# Patient Record
Sex: MALE | Race: ASIAN | NOT HISPANIC OR LATINO
[De-identification: names, ages, dates, MRNs, and addresses within clinical notes are randomized per-mention and may not be internally consistent; named-entity substitution may affect disease eponyms.]

---

## 2022-03-18 PROBLEM — Z00.00 ENCOUNTER FOR PREVENTIVE HEALTH EXAMINATION: Status: ACTIVE | Noted: 2022-03-18

## 2022-03-24 ENCOUNTER — APPOINTMENT (OUTPATIENT)
Dept: THORACIC SURGERY | Facility: CLINIC | Age: 57
End: 2022-03-24
Payer: MEDICAID

## 2022-03-24 VITALS
SYSTOLIC BLOOD PRESSURE: 108 MMHG | HEART RATE: 64 BPM | WEIGHT: 163 LBS | HEIGHT: 68.11 IN | RESPIRATION RATE: 18 BRPM | DIASTOLIC BLOOD PRESSURE: 73 MMHG | OXYGEN SATURATION: 98 % | TEMPERATURE: 98.1 F | BODY MASS INDEX: 24.71 KG/M2

## 2022-03-24 DIAGNOSIS — Z86.69 PERSONAL HISTORY OF OTHER DISEASES OF THE NERVOUS SYSTEM AND SENSE ORGANS: ICD-10-CM

## 2022-03-24 DIAGNOSIS — Z87.891 PERSONAL HISTORY OF NICOTINE DEPENDENCE: ICD-10-CM

## 2022-03-24 DIAGNOSIS — Z82.3 FAMILY HISTORY OF STROKE: ICD-10-CM

## 2022-03-24 DIAGNOSIS — Z86.39 PERSONAL HISTORY OF OTHER ENDOCRINE, NUTRITIONAL AND METABOLIC DISEASE: ICD-10-CM

## 2022-03-24 PROCEDURE — 99205 OFFICE O/P NEW HI 60 MIN: CPT

## 2022-03-25 PROBLEM — Z86.39 HISTORY OF HYPERLIPIDEMIA: Status: RESOLVED | Noted: 2022-03-25 | Resolved: 2022-03-25

## 2022-03-25 PROBLEM — Z87.891 FORMER SMOKER: Status: ACTIVE | Noted: 2022-03-25

## 2022-03-25 PROBLEM — Z82.3 FAMILY HISTORY OF CEREBROVASCULAR ACCIDENT (CVA): Status: ACTIVE | Noted: 2022-03-25

## 2022-03-25 PROBLEM — Z86.69 HISTORY OF MIGRAINE HEADACHES: Status: RESOLVED | Noted: 2022-03-25 | Resolved: 2022-03-25

## 2022-03-25 RX ORDER — OMEPRAZOLE 40 MG/1
40 CAPSULE, DELAYED RELEASE ORAL
Refills: 0 | Status: ACTIVE | COMMUNITY

## 2022-03-25 RX ORDER — ATORVASTATIN CALCIUM 20 MG/1
20 TABLET, FILM COATED ORAL
Refills: 0 | Status: ACTIVE | COMMUNITY

## 2022-03-25 RX ORDER — ASPIRIN 81 MG
81 TABLET, DELAYED RELEASE (ENTERIC COATED) ORAL
Refills: 0 | Status: ACTIVE | COMMUNITY

## 2022-03-28 NOTE — ASSESSMENT
[FreeTextEntry1] : Mr. AMBROSIO LAGUNA, 56 year old male, former smoker, w/ hx of HLD, migraine/headache, who presented to Pulm with worsening snoring and cloudy head in the morning, suspicious for FILI. His recent CT Chest showed abnormal anterior mediastinal mass which is suspicious for malignancy due to smoking history. \par \par PFTs on 2/22/22: %, FEV1 102%, DLCO 63%\par \par CT Chest on 3/15/22:\par - a 1.4 x 1.8 cm soft tissue nodule in the anterior mediastinum which has lobular margins\par \par I have reviewed the patient's medical records and diagnostic images at time of this office consultation and have made the following recommendation:\par 1. CT reviewed with pt, suspicious for thymoma. I recommended Rt VATS, robotic-assisted, excision of anterior mediastinal mass on 4/19/22. Risks and benefits and alternatives explained to patient, all questions answered, patient agreed to proceed with surgery.\par 2. Medical clearance\par 3. Blood work for M.G. panel\par \par \par I personally performed the services described in the documentation, reviewed the documentation recorded by the scribe in my presence and it accurately and completely records my words and actions. \par \par I, Samantha Foreman NP, am scribing for and the presence of Dr. Marc Mancilla the following sections, HISTORY OF PRESENT ILLNESS, PAST MEDICAL/FAMILY/SOCIAL HISTORY; REVIEW OF SYSTEMS; VITAL SIGNS; PHYSICAL EXAM; DISPOSITION.\par

## 2022-03-28 NOTE — DATA REVIEWED
[FreeTextEntry1] : I have independently reviewed patient's \par PFTs on 2/22/22\par CT Chest on 3/15/22\par \par

## 2022-03-28 NOTE — CONSULT LETTER
[Dear  ___] : Dear  [unfilled], [Consult Letter:] : I had the pleasure of evaluating your patient, [unfilled]. [( Thank you for referring [unfilled] for consultation for _____ )] : Thank you for referring [unfilled] for consultation for [unfilled] [Please see my note below.] : Please see my note below. [Consult Closing:] : Thank you very much for allowing me to participate in the care of this patient.  If you have any questions, please do not hesitate to contact me. [Sincerely,] : Sincerely, [FreeTextEntry2] : Eugenio Clay MD (Pulm/ref) [FreeTextEntry3] : Marc Mancilla MD, MPH \par System Director of Thoracic Surgery \par Director of Comprehensive Lung and Foregut [place] \par Professor Cardiovascular & Thoracic Surgery  \par [facility] [employer] [facility]\par

## 2022-03-28 NOTE — HISTORY OF PRESENT ILLNESS
[FreeTextEntry1] : Mr. AMBROSIO LAGUNA, 56 year old male, former smoker, w/ hx of HLD, migraine/headache, who presented to Pulm with worsening snoring and cloudy head in the morning, suspicious for FILI. His recent CT Chest showed abnormal anterior mediastinal mass which is suspicious for malignancy due to smoking history. \par \par PFTs on 2/22/22: %, FEV1 102%, DLCO 63%\par \par CT Chest on 3/15/22:\par - a 1.4 x 1.8 cm soft tissue nodule in the anterior mediastinum which has lobular margins\par \par Pt presents today for CT Sx consultation, referred by Dr. Clay. Pt admits to generalized body aches, SOB on exertion, poor appetite, and weight loss. \par  ONOFRE/SHORTNESS OF BREATH

## 2022-03-28 NOTE — PHYSICAL EXAM
[Restricted in physically strenuous activity but ambulatory and able to carry out work of a light or sedentary nature] : Status 1- Restricted in physically strenuous activity but ambulatory and able to carry out work of a light or sedentary nature, e.g., light house work, office work [General Appearance - Alert] : alert [General Appearance - In No Acute Distress] : in no acute distress [Sclera] : the sclera and conjunctiva were normal [PERRL With Normal Accommodation] : pupils were equal in size, round, and reactive to light [Extraocular Movements] : extraocular movements were intact [Outer Ear] : the ears and nose were normal in appearance [Oropharynx] : the oropharynx was normal [Neck Appearance] : the appearance of the neck was normal [Neck Cervical Mass (___cm)] : no neck mass was observed [Jugular Venous Distention Increased] : there was no jugular-venous distention [Thyroid Diffuse Enlargement] : the thyroid was not enlarged [Thyroid Nodule] : there were no palpable thyroid nodules [Auscultation Breath Sounds / Voice Sounds] : lungs were clear to auscultation bilaterally [Heart Rate And Rhythm] : heart rate was normal and rhythm regular [Heart Sounds] : normal S1 and S2 [Heart Sounds Gallop] : no gallops [Murmurs] : no murmurs [Heart Sounds Pericardial Friction Rub] : no pericardial rub [Examination Of The Chest] : the chest was normal in appearance [Diminished Respiratory Excursion] : normal chest expansion [Chest Visual Inspection Thoracic Asymmetry] : no chest asymmetry [2+] : right 2+ [Breast Appearance] : normal in appearance [Breast Palpation Mass] : no palpable masses [Abdomen Soft] : soft [Bowel Sounds] : normal bowel sounds [Abdomen Tenderness] : non-tender [Abdomen Mass (___ Cm)] : no abdominal mass palpated [Cervical Lymph Nodes Enlarged Posterior Bilaterally] : posterior cervical [Cervical Lymph Nodes Enlarged Anterior Bilaterally] : anterior cervical [Supraclavicular Lymph Nodes Enlarged Bilaterally] : supraclavicular [No CVA Tenderness] : no ~M costovertebral angle tenderness [No Spinal Tenderness] : no spinal tenderness [Abnormal Walk] : normal gait [Nail Clubbing] : no clubbing  or cyanosis of the fingernails [Musculoskeletal - Swelling] : no joint swelling seen [Motor Tone] : muscle strength and tone were normal [Skin Color & Pigmentation] : normal skin color and pigmentation [Skin Turgor] : normal skin turgor [] : no rash [Deep Tendon Reflexes (DTR)] : deep tendon reflexes were 2+ and symmetric [Sensation] : the sensory exam was normal to light touch and pinprick [No Focal Deficits] : no focal deficits [Oriented To Time, Place, And Person] : oriented to person, place, and time [Impaired Insight] : insight and judgment were intact [Affect] : the affect was normal [FreeTextEntry1] : deferred

## 2022-04-07 ENCOUNTER — OUTPATIENT (OUTPATIENT)
Dept: OUTPATIENT SERVICES | Facility: HOSPITAL | Age: 57
LOS: 1 days | End: 2022-04-07
Payer: MEDICAID

## 2022-04-07 VITALS
TEMPERATURE: 98 F | WEIGHT: 162.04 LBS | SYSTOLIC BLOOD PRESSURE: 115 MMHG | HEIGHT: 68 IN | RESPIRATION RATE: 16 BRPM | OXYGEN SATURATION: 99 % | DIASTOLIC BLOOD PRESSURE: 80 MMHG | HEART RATE: 65 BPM

## 2022-04-07 DIAGNOSIS — J98.59 OTHER DISEASES OF MEDIASTINUM, NOT ELSEWHERE CLASSIFIED: ICD-10-CM

## 2022-04-07 LAB
ALBUMIN SERPL ELPH-MCNC: 4.6 G/DL — SIGNIFICANT CHANGE UP (ref 3.3–5)
ALP SERPL-CCNC: 81 U/L — SIGNIFICANT CHANGE UP (ref 40–120)
ALT FLD-CCNC: 40 U/L — SIGNIFICANT CHANGE UP (ref 4–41)
ANION GAP SERPL CALC-SCNC: 13 MMOL/L — SIGNIFICANT CHANGE UP (ref 7–14)
AST SERPL-CCNC: 26 U/L — SIGNIFICANT CHANGE UP (ref 4–40)
BILIRUB SERPL-MCNC: 0.2 MG/DL — SIGNIFICANT CHANGE UP (ref 0.2–1.2)
BLD GP AB SCN SERPL QL: NEGATIVE — SIGNIFICANT CHANGE UP
BUN SERPL-MCNC: 19 MG/DL — SIGNIFICANT CHANGE UP (ref 7–23)
CALCIUM SERPL-MCNC: 8.8 MG/DL — SIGNIFICANT CHANGE UP (ref 8.4–10.5)
CHLORIDE SERPL-SCNC: 104 MMOL/L — SIGNIFICANT CHANGE UP (ref 98–107)
CO2 SERPL-SCNC: 22 MMOL/L — SIGNIFICANT CHANGE UP (ref 22–31)
CREAT SERPL-MCNC: 0.95 MG/DL — SIGNIFICANT CHANGE UP (ref 0.5–1.3)
EGFR: 93 ML/MIN/1.73M2 — SIGNIFICANT CHANGE UP
GLUCOSE SERPL-MCNC: 113 MG/DL — HIGH (ref 70–99)
HCT VFR BLD CALC: 39.1 % — SIGNIFICANT CHANGE UP (ref 39–50)
HGB BLD-MCNC: 13.6 G/DL — SIGNIFICANT CHANGE UP (ref 13–17)
MCHC RBC-ENTMCNC: 32.1 PG — SIGNIFICANT CHANGE UP (ref 27–34)
MCHC RBC-ENTMCNC: 34.8 GM/DL — SIGNIFICANT CHANGE UP (ref 32–36)
MCV RBC AUTO: 92.2 FL — SIGNIFICANT CHANGE UP (ref 80–100)
NRBC # BLD: 0 /100 WBCS — SIGNIFICANT CHANGE UP
NRBC # FLD: 0 K/UL — SIGNIFICANT CHANGE UP
PLATELET # BLD AUTO: 192 K/UL — SIGNIFICANT CHANGE UP (ref 150–400)
POTASSIUM SERPL-MCNC: 4.1 MMOL/L — SIGNIFICANT CHANGE UP (ref 3.5–5.3)
POTASSIUM SERPL-SCNC: 4.1 MMOL/L — SIGNIFICANT CHANGE UP (ref 3.5–5.3)
PROT SERPL-MCNC: 6.9 G/DL — SIGNIFICANT CHANGE UP (ref 6–8.3)
RBC # BLD: 4.24 M/UL — SIGNIFICANT CHANGE UP (ref 4.2–5.8)
RBC # FLD: 12.2 % — SIGNIFICANT CHANGE UP (ref 10.3–14.5)
RH IG SCN BLD-IMP: POSITIVE — SIGNIFICANT CHANGE UP
SODIUM SERPL-SCNC: 139 MMOL/L — SIGNIFICANT CHANGE UP (ref 135–145)
WBC # BLD: 5.24 K/UL — SIGNIFICANT CHANGE UP (ref 3.8–10.5)
WBC # FLD AUTO: 5.24 K/UL — SIGNIFICANT CHANGE UP (ref 3.8–10.5)

## 2022-04-07 PROCEDURE — 93010 ELECTROCARDIOGRAM REPORT: CPT

## 2022-04-07 RX ORDER — SODIUM CHLORIDE 9 MG/ML
1000 INJECTION, SOLUTION INTRAVENOUS
Refills: 0 | Status: CANCELLED | OUTPATIENT
Start: 2022-04-19 | End: 2022-04-14

## 2022-04-07 NOTE — H&P PST ADULT - NSICDXPASTMEDICALHX_GEN_ALL_CORE_FT
PAST MEDICAL HISTORY:  Former smoker     GERD (gastroesophageal reflux disease)     HLD (hyperlipidemia)     Migraine

## 2022-04-07 NOTE — H&P PST ADULT - ALCOHOL USE HISTORY SINGLE SELECT
Head,  normocephalic,  atraumatic,  Face,  Face within normal limits,  Ears,  External ears within normal limits,  Nose/Nasopharynx,  External nose  normal appearance
never

## 2022-04-07 NOTE — H&P PST ADULT - HISTORY OF PRESENT ILLNESS
56 year old male with PMH of HLD, migraine, former smoker  presents to Presurgical testing with diagnosis of  other disease of mediastinum , not elsewhere classified  scheduled for robotic right video assisted thoracoscopy, excision of anterior mediastinum mass.

## 2022-04-07 NOTE — H&P PST ADULT - NSANTHOSAYNRD_GEN_A_CORE
No. FILI screening performed.  STOP BANG Legend: 0-2 = LOW Risk; 3-4 = INTERMEDIATE Risk; 5-8 = HIGH Risk

## 2022-04-07 NOTE — H&P PST ADULT - PROBLEM SELECTOR PLAN 1
Patient tentatively scheduled for robotic right video assisted thoracoscopy, excision of anterior mediastinum mass on 4/19/22.  Pre-op instructions provided. Pt given verbal and written instructions with teach back on chlorhexidine wash and pepcid. Pt verbalized understanding with return demonstration.   Patient instructed to call surgeon's office to schedule a COVID-19 test  prior to procedure.   FILI precautions

## 2022-04-12 NOTE — ASU PATIENT PROFILE, ADULT - FALL HARM RISK - UNIVERSAL INTERVENTIONS
Bed in lowest position, wheels locked, appropriate side rails in place/Call bell, personal items and telephone in reach/Instruct patient to call for assistance before getting out of bed or chair/Non-slip footwear when patient is out of bed/[place] to call system/Physically safe environment - no spills, clutter or unnecessary equipment/Purposeful Proactive Rounding/Room/bathroom lighting operational, light cord in reach

## 2022-04-13 ENCOUNTER — APPOINTMENT (OUTPATIENT)
Dept: THORACIC SURGERY | Facility: HOSPITAL | Age: 57
End: 2022-04-13

## 2022-04-13 ENCOUNTER — INPATIENT (INPATIENT)
Facility: HOSPITAL | Age: 57
LOS: 0 days | Discharge: HOME | End: 2022-04-14
Attending: THORACIC SURGERY (CARDIOTHORACIC VASCULAR SURGERY) | Admitting: THORACIC SURGERY (CARDIOTHORACIC VASCULAR SURGERY)
Payer: MEDICAID

## 2022-04-13 ENCOUNTER — RESULT REVIEW (OUTPATIENT)
Age: 57
End: 2022-04-13

## 2022-04-13 VITALS
WEIGHT: 162.04 LBS | RESPIRATION RATE: 16 BRPM | HEART RATE: 56 BPM | SYSTOLIC BLOOD PRESSURE: 112 MMHG | TEMPERATURE: 98 F | OXYGEN SATURATION: 99 % | DIASTOLIC BLOOD PRESSURE: 68 MMHG | HEIGHT: 68 IN

## 2022-04-13 DIAGNOSIS — J98.59 OTHER DISEASES OF MEDIASTINUM, NOT ELSEWHERE CLASSIFIED: ICD-10-CM

## 2022-04-13 LAB — RH IG SCN BLD-IMP: POSITIVE — SIGNIFICANT CHANGE UP

## 2022-04-13 PROCEDURE — 71045 X-RAY EXAM CHEST 1 VIEW: CPT | Mod: 26

## 2022-04-13 PROCEDURE — 99233 SBSQ HOSP IP/OBS HIGH 50: CPT

## 2022-04-13 PROCEDURE — 32673 THORACOSCOPY W/THYMUS RESECT: CPT

## 2022-04-13 PROCEDURE — 32673 THORACOSCOPY W/THYMUS RESECT: CPT | Mod: AS

## 2022-04-13 PROCEDURE — 32662 THORACOSCOPY W/MEDIAST EXC: CPT | Mod: 59

## 2022-04-13 PROCEDURE — 88307 TISSUE EXAM BY PATHOLOGIST: CPT | Mod: 26

## 2022-04-13 PROCEDURE — 32662 THORACOSCOPY W/MEDIAST EXC: CPT | Mod: AS,59

## 2022-04-13 PROCEDURE — S2900 ROBOTIC SURGICAL SYSTEM: CPT | Mod: NC

## 2022-04-13 RX ORDER — OMEPRAZOLE 10 MG/1
1 CAPSULE, DELAYED RELEASE ORAL
Qty: 0 | Refills: 0 | DISCHARGE

## 2022-04-13 RX ORDER — ATORVASTATIN CALCIUM 80 MG/1
20 TABLET, FILM COATED ORAL AT BEDTIME
Refills: 0 | Status: DISCONTINUED | OUTPATIENT
Start: 2022-04-13 | End: 2022-04-14

## 2022-04-13 RX ORDER — NALOXONE HYDROCHLORIDE 4 MG/.1ML
0.1 SPRAY NASAL
Refills: 0 | Status: DISCONTINUED | OUTPATIENT
Start: 2022-04-13 | End: 2022-04-14

## 2022-04-13 RX ORDER — PANTOPRAZOLE SODIUM 20 MG/1
40 TABLET, DELAYED RELEASE ORAL
Refills: 0 | Status: DISCONTINUED | OUTPATIENT
Start: 2022-04-13 | End: 2022-04-14

## 2022-04-13 RX ORDER — HYDROMORPHONE HYDROCHLORIDE 2 MG/ML
30 INJECTION INTRAMUSCULAR; INTRAVENOUS; SUBCUTANEOUS
Refills: 0 | Status: DISCONTINUED | OUTPATIENT
Start: 2022-04-13 | End: 2022-04-14

## 2022-04-13 RX ORDER — GABAPENTIN 400 MG/1
300 CAPSULE ORAL ONCE
Refills: 0 | Status: COMPLETED | OUTPATIENT
Start: 2022-04-13 | End: 2022-04-13

## 2022-04-13 RX ORDER — HEPARIN SODIUM 5000 [USP'U]/ML
5000 INJECTION INTRAVENOUS; SUBCUTANEOUS EVERY 8 HOURS
Refills: 0 | Status: DISCONTINUED | OUTPATIENT
Start: 2022-04-13 | End: 2022-04-14

## 2022-04-13 RX ORDER — ATORVASTATIN CALCIUM 80 MG/1
1 TABLET, FILM COATED ORAL
Qty: 0 | Refills: 0 | DISCHARGE

## 2022-04-13 RX ORDER — ONDANSETRON 8 MG/1
4 TABLET, FILM COATED ORAL EVERY 6 HOURS
Refills: 0 | Status: DISCONTINUED | OUTPATIENT
Start: 2022-04-13 | End: 2022-04-14

## 2022-04-13 RX ORDER — ACETAMINOPHEN 500 MG
975 TABLET ORAL ONCE
Refills: 0 | Status: COMPLETED | OUTPATIENT
Start: 2022-04-13 | End: 2022-04-13

## 2022-04-13 RX ORDER — HYDROMORPHONE HYDROCHLORIDE 2 MG/ML
0.5 INJECTION INTRAMUSCULAR; INTRAVENOUS; SUBCUTANEOUS
Refills: 0 | Status: DISCONTINUED | OUTPATIENT
Start: 2022-04-13 | End: 2022-04-14

## 2022-04-13 RX ADMIN — GABAPENTIN 300 MILLIGRAM(S): 400 CAPSULE ORAL at 06:22

## 2022-04-13 RX ADMIN — Medication 975 MILLIGRAM(S): at 06:22

## 2022-04-13 RX ADMIN — HYDROMORPHONE HYDROCHLORIDE 30 MILLILITER(S): 2 INJECTION INTRAMUSCULAR; INTRAVENOUS; SUBCUTANEOUS at 15:18

## 2022-04-13 RX ADMIN — ATORVASTATIN CALCIUM 20 MILLIGRAM(S): 80 TABLET, FILM COATED ORAL at 21:43

## 2022-04-13 RX ADMIN — HEPARIN SODIUM 5000 UNIT(S): 5000 INJECTION INTRAVENOUS; SUBCUTANEOUS at 13:26

## 2022-04-13 RX ADMIN — HEPARIN SODIUM 5000 UNIT(S): 5000 INJECTION INTRAVENOUS; SUBCUTANEOUS at 21:43

## 2022-04-13 NOTE — PATIENT PROFILE ADULT - PUBLIC BENEFITS
Pt has had OV within the 12 month protocol and lipid panel is current. 6 month supply sent to pharmacy.   Lab Results   Component Value Date/Time    CHOLSTRLTOT 186 12/27/2019 08:27 AM     (H) 12/27/2019 08:27 AM    HDL 47 12/27/2019 08:27 AM    TRIGLYCERIDE 144 12/27/2019 08:27 AM       Lab Results   Component Value Date/Time    SODIUM 137 12/27/2019 08:27 AM    POTASSIUM 4.2 12/27/2019 08:27 AM    CHLORIDE 104 12/27/2019 08:27 AM    CO2 27 12/27/2019 08:27 AM    GLUCOSE 114 (H) 12/27/2019 08:27 AM    BUN 13 12/27/2019 08:27 AM    CREATININE 0.96 12/27/2019 08:27 AM     Lab Results   Component Value Date/Time    ALKPHOSPHAT 64 12/27/2019 08:27 AM    ASTSGOT 38 12/27/2019 08:27 AM    ALTSGPT 55 (H) 12/27/2019 08:27 AM    TBILIRUBIN 0.7 12/27/2019 08:27 AM        
Was the patient seen in the last year in this department? Yes    Does patient have an active prescription for medications requested? No     Received Request Via: Pharmacy      Pt met protocol?: Yes, OV 8/19   BP Readings from Last 1 Encounters:   08/22/19 118/88     Lab Results   Component Value Date/Time    CHOLSTRLTOT 186 12/27/2019 08:27 AM     (H) 12/27/2019 08:27 AM    HDL 47 12/27/2019 08:27 AM    TRIGLYCERIDE 144 12/27/2019 08:27 AM       Lab Results   Component Value Date/Time    SODIUM 137 12/27/2019 08:27 AM    POTASSIUM 4.2 12/27/2019 08:27 AM    CHLORIDE 104 12/27/2019 08:27 AM    CO2 27 12/27/2019 08:27 AM    GLUCOSE 114 (H) 12/27/2019 08:27 AM    BUN 13 12/27/2019 08:27 AM    CREATININE 0.96 12/27/2019 08:27 AM     Lab Results   Component Value Date/Time    ALKPHOSPHAT 64 12/27/2019 08:27 AM    ASTSGOT 38 12/27/2019 08:27 AM    ALTSGPT 55 (H) 12/27/2019 08:27 AM    TBILIRUBIN 0.7 12/27/2019 08:27 AM             
no

## 2022-04-13 NOTE — BRIEF OPERATIVE NOTE - COMMENTS
I first assisted through out the entire case, including port placement, bedside assist while the surgeon at the console, and wound closure, Nesha Arzola

## 2022-04-13 NOTE — PROGRESS NOTE ADULT - SUBJECTIVE AND OBJECTIVE BOX
CHIEF COMPLAINT: FOLLOW UP IN ICU FOR POSTOPERATIVE CARE OF PATIENT WHO IS S/P  RVATS, thymectomy      PROCEDURES:      FB, robot-assisted RVATS, thymectomy. 13-Apr-2022        ISSUES:       Thymoma   Postop pain   Chest tube in place   Migraine   Dyslipidemia         INTERVAL EVENTS:      OR today. Extubated in OR. Transferred to CTICU.               HISTORY:      Patient reports moderate pain at chest wall incision sites which is worse with coughing and deep breathing without associated fever or dyspnea. Pain is improved with use of pain meds.           PHYSICAL EXAM:      Gen: Comfortable, No acute distress     Eyes: Sclera white, Conjunctiva normal, Eyelids normal, Pupils symmetrical      ENT: Mucous membranes moist,  ,  ,       Neck: Trachea midline,  ,  ,  ,  ,  ,       CV: Rate regular, Rhythm regular,  ,  ,       Resp: Breath sounds clear, No accessory muscles use, R chest tube in place,  ,       Abd: Soft, Non-distended, Non-tender, Bowel sounds normal,  ,  ,       Skin: Warm, No peripheral edema of lower extremities,  ,       : No lee     Neuro: Moving all 4 extremities,       Psych: A&Ox3               ASSESSMENT AND PLAN:           NEURO:     Post-operative Pain - Stable. Pain control with PCA and Tylenol IV PRN.             RESPIRATORY:   Hypoxia - Wean nasal cannula for goal O2sat above 92. Obtain CXR. Incentive spirometry. Chest PT and frequent suctioning. Continue bronchodilators. OOB to chair & ambulate w/ assistance. Continuous pulse oximetry for support & to prevent decompensation.       Chest tube – Pleurevac regulated suctioning. Monitor chest tube output.              CARDIOVASCULAR:     Hemodynamically stable - Not on pressors. Continue hemodynamic monitoring.     Telemetry (medical test) - Reviewed by me today independently. Normal sinus rhythm.               RENAL:     Stable - Monitor IOs and electrolytes. Keep K above 4.0 and Mg above 2.0.           GASTROINTESTINAL:     GI prophylaxis not indicated     Zofran and Reglan IV PRN for nausea     Regular consistency diet           HEMATOLOGIC:     No signs of active bleeding. Monitor Hgb in CBC in AM     DVT prophylaxis with heparin subQ and SCDs.             INFECTIOUS DISEASE:     All surgical sites appear clean. No signs of active infection. Will monitor for fever and leukocytosis.           ENDOCRINE:     Stable – Monitor glucose fingersticks for goal 120-180.           ONCOLOGY:     Mediastinal mass- Improved. S/P resection. Follow up final pathology.        Pertinent clinical, laboratory, radiographic, hemodynamic, echocardiographic, respiratory data, microbiologic data and chart were reviewed by myself and analyzed frequently throughout the course of the day and night by myself.       Plan discussed at length with the CTICU staff and Attending CT Surgeon -   Dr Marc Mancilla.        Patient's status was discussed with patient at bedside.     _________________________  VITAL SIGNS:  Vital Signs Last 24 Hrs  T(C): 36.3 (13 Apr 2022 12:00), Max: 36.4 (13 Apr 2022 05:36)  T(F): 97.4 (13 Apr 2022 12:00), Max: 97.5 (13 Apr 2022 05:36)  HR: 65 (13 Apr 2022 13:00) (56 - 65)  BP: 110/64 (13 Apr 2022 13:00) (105/73 - 115/72)  BP(mean): 73 (13 Apr 2022 13:00) (73 - 84)  RR: 13 (13 Apr 2022 13:00) (12 - 17)  SpO2: 97% (13 Apr 2022 13:00) (96% - 100%)  I/Os:   I&O's Detail    13 Apr 2022 07:01  -  13 Apr 2022 13:29  --------------------------------------------------------  IN:    Lactated Ringers: 120 mL  Total IN: 120 mL    OUT:    Chest Tube (mL): 5 mL  Total OUT: 5 mL    Total NET: 115 mL              MEDICATIONS:  MEDICATIONS  (STANDING):  atorvastatin 20 milliGRAM(s) Oral at bedtime  heparin   Injectable 5000 Unit(s) SubCutaneous every 8 hours  HYDROmorphone PCA (1 mG/mL) 30 milliLiter(s) PCA Continuous PCA Continuous  pantoprazole    Tablet 40 milliGRAM(s) Oral before breakfast    MEDICATIONS  (PRN):  HYDROmorphone PCA (1 mG/mL) Rescue Clinician Bolus 0.5 milliGRAM(s) IV Push every 15 minutes PRN for Pain Scale GREATER THAN 6  naloxone Injectable 0.1 milliGRAM(s) IV Push every 3 minutes PRN For ANY of the following changes in patient status:  A. RR LESS THAN 10 breaths per minute, B. Oxygen saturation LESS THAN 90%, C. Sedation score of 6  ondansetron Injectable 4 milliGRAM(s) IV Push every 6 hours PRN Nausea      LABS:  Laboratory data was independently reviewed by me today.                       RADIOLOGY:   Radiology images were independently reviewed by me today. Reports were reviewed by me today.    Xray Chest 1 View- PORTABLE-Urgent:   ACC: [id number] EXAM:  XR CHEST PORTABLE URGENT 1V                          PROCEDURE DATE:  04/13/2022          INTERPRETATION:  CLINICAL INFORMATION: Status post right VATS, thymectomy.    TECHNIQUE: Frontal view of the chest    COMPARISON: The chest 3/15/2022.    FINDINGS:  Right chest tube in place. No definite pneumothorax area  The lungs are clear.  Heart size is normal.  No acute osseous abnormality.      IMPRESSION: Status post thymectomy without pneumothorax and with chest   tube.    ---End of Report ---          MIN SANDERS MD; Resident Radiology  This document has been electronically signed.  CHRIS BOLDEN MD; Attending Radiologist  This document has been electronically signed. Apr 13 2022 11:51AM (04-13-22 @ 11:19)                           ________________________________________________

## 2022-04-13 NOTE — PATIENT PROFILE ADULT - FALL HARM RISK - RISK INTERVENTIONS
Assistance OOB with selected safe patient handling equipment/Assistance with ambulation/Communicate Fall Risk and Risk Factors to all staff, patient, and family/Monitor gait and stability/Reinforce activity limits and safety measures with patient and family/Sit up slowly, dangle for a short time, stand at bedside before walking/Use of alarms - bed, chair and/or voice tab/Visual Cue: Yellow wristband/Bed in lowest position, wheels locked, appropriate side rails in place/Call bell, personal items and telephone in reach/Instruct patient to call for assistance before getting out of bed or chair/Non-slip footwear when patient is out of bed/[place] to call system/Physically safe environment - no spills, clutter or unnecessary equipment/Purposeful Proactive Rounding/Room/bathroom lighting operational, light cord in reach

## 2022-04-14 ENCOUNTER — TRANSCRIPTION ENCOUNTER (OUTPATIENT)
Age: 57
End: 2022-04-14

## 2022-04-14 VITALS
DIASTOLIC BLOOD PRESSURE: 60 MMHG | OXYGEN SATURATION: 96 % | RESPIRATION RATE: 19 BRPM | HEART RATE: 64 BPM | SYSTOLIC BLOOD PRESSURE: 107 MMHG

## 2022-04-14 LAB
ANION GAP SERPL CALC-SCNC: 14 MMOL/L — SIGNIFICANT CHANGE UP (ref 7–14)
BASOPHILS # BLD AUTO: 0.01 K/UL — SIGNIFICANT CHANGE UP (ref 0–0.2)
BASOPHILS NFR BLD AUTO: 0.1 % — SIGNIFICANT CHANGE UP (ref 0–2)
BUN SERPL-MCNC: 16 MG/DL — SIGNIFICANT CHANGE UP (ref 7–23)
CALCIUM SERPL-MCNC: 8.7 MG/DL — SIGNIFICANT CHANGE UP (ref 8.4–10.5)
CHLORIDE SERPL-SCNC: 102 MMOL/L — SIGNIFICANT CHANGE UP (ref 98–107)
CO2 SERPL-SCNC: 23 MMOL/L — SIGNIFICANT CHANGE UP (ref 22–31)
CREAT SERPL-MCNC: 0.84 MG/DL — SIGNIFICANT CHANGE UP (ref 0.5–1.3)
EGFR: 101 ML/MIN/1.73M2 — SIGNIFICANT CHANGE UP
EOSINOPHIL # BLD AUTO: 0.02 K/UL — SIGNIFICANT CHANGE UP (ref 0–0.5)
EOSINOPHIL NFR BLD AUTO: 0.2 % — SIGNIFICANT CHANGE UP (ref 0–6)
GLUCOSE SERPL-MCNC: 122 MG/DL — HIGH (ref 70–99)
HCT VFR BLD CALC: 40.6 % — SIGNIFICANT CHANGE UP (ref 39–50)
HGB BLD-MCNC: 13.7 G/DL — SIGNIFICANT CHANGE UP (ref 13–17)
IANC: 7.27 K/UL — SIGNIFICANT CHANGE UP (ref 1.8–7.4)
IMM GRANULOCYTES NFR BLD AUTO: 0.4 % — SIGNIFICANT CHANGE UP (ref 0–1.5)
LYMPHOCYTES # BLD AUTO: 1.25 K/UL — SIGNIFICANT CHANGE UP (ref 1–3.3)
LYMPHOCYTES # BLD AUTO: 13.3 % — SIGNIFICANT CHANGE UP (ref 13–44)
MCHC RBC-ENTMCNC: 32.1 PG — SIGNIFICANT CHANGE UP (ref 27–34)
MCHC RBC-ENTMCNC: 33.7 GM/DL — SIGNIFICANT CHANGE UP (ref 32–36)
MCV RBC AUTO: 95.1 FL — SIGNIFICANT CHANGE UP (ref 80–100)
MONOCYTES # BLD AUTO: 0.84 K/UL — SIGNIFICANT CHANGE UP (ref 0–0.9)
MONOCYTES NFR BLD AUTO: 8.9 % — SIGNIFICANT CHANGE UP (ref 2–14)
NEUTROPHILS # BLD AUTO: 7.27 K/UL — SIGNIFICANT CHANGE UP (ref 1.8–7.4)
NEUTROPHILS NFR BLD AUTO: 77.1 % — HIGH (ref 43–77)
NRBC # BLD: 0 /100 WBCS — SIGNIFICANT CHANGE UP
NRBC # FLD: 0 K/UL — SIGNIFICANT CHANGE UP
PLATELET # BLD AUTO: 168 K/UL — SIGNIFICANT CHANGE UP (ref 150–400)
POTASSIUM SERPL-MCNC: 4 MMOL/L — SIGNIFICANT CHANGE UP (ref 3.5–5.3)
POTASSIUM SERPL-SCNC: 4 MMOL/L — SIGNIFICANT CHANGE UP (ref 3.5–5.3)
RBC # BLD: 4.27 M/UL — SIGNIFICANT CHANGE UP (ref 4.2–5.8)
RBC # FLD: 12.2 % — SIGNIFICANT CHANGE UP (ref 10.3–14.5)
SODIUM SERPL-SCNC: 139 MMOL/L — SIGNIFICANT CHANGE UP (ref 135–145)
WBC # BLD: 9.43 K/UL — SIGNIFICANT CHANGE UP (ref 3.8–10.5)
WBC # FLD AUTO: 9.43 K/UL — SIGNIFICANT CHANGE UP (ref 3.8–10.5)

## 2022-04-14 PROCEDURE — 99233 SBSQ HOSP IP/OBS HIGH 50: CPT

## 2022-04-14 PROCEDURE — 71045 X-RAY EXAM CHEST 1 VIEW: CPT | Mod: 26

## 2022-04-14 PROCEDURE — 71045 X-RAY EXAM CHEST 1 VIEW: CPT | Mod: 26,77

## 2022-04-14 RX ORDER — POLYETHYLENE GLYCOL 3350 17 G/17G
17 POWDER, FOR SOLUTION ORAL
Qty: 119 | Refills: 0
Start: 2022-04-14 | End: 2022-04-20

## 2022-04-14 RX ORDER — HYDROMORPHONE HYDROCHLORIDE 2 MG/ML
0.3 INJECTION INTRAMUSCULAR; INTRAVENOUS; SUBCUTANEOUS
Refills: 0 | Status: DISCONTINUED | OUTPATIENT
Start: 2022-04-14 | End: 2022-04-14

## 2022-04-14 RX ORDER — ACETAMINOPHEN 500 MG
650 TABLET ORAL EVERY 6 HOURS
Refills: 0 | Status: DISCONTINUED | OUTPATIENT
Start: 2022-04-14 | End: 2022-04-14

## 2022-04-14 RX ORDER — OXYCODONE HYDROCHLORIDE 5 MG/1
1 TABLET ORAL
Qty: 30 | Refills: 0
Start: 2022-04-14 | End: 2022-04-18

## 2022-04-14 RX ORDER — SENNA PLUS 8.6 MG/1
2 TABLET ORAL
Qty: 14 | Refills: 0
Start: 2022-04-14 | End: 2022-04-20

## 2022-04-14 RX ORDER — ACETAMINOPHEN 500 MG
2 TABLET ORAL
Qty: 84 | Refills: 0
Start: 2022-04-14 | End: 2022-04-20

## 2022-04-14 RX ORDER — OXYCODONE HYDROCHLORIDE 5 MG/1
5 TABLET ORAL
Refills: 0 | Status: DISCONTINUED | OUTPATIENT
Start: 2022-04-14 | End: 2022-04-14

## 2022-04-14 RX ADMIN — PANTOPRAZOLE SODIUM 40 MILLIGRAM(S): 20 TABLET, DELAYED RELEASE ORAL at 06:05

## 2022-04-14 RX ADMIN — HEPARIN SODIUM 5000 UNIT(S): 5000 INJECTION INTRAVENOUS; SUBCUTANEOUS at 05:47

## 2022-04-14 RX ADMIN — Medication 650 MILLIGRAM(S): at 11:45

## 2022-04-14 RX ADMIN — Medication 650 MILLIGRAM(S): at 11:15

## 2022-04-14 NOTE — PROGRESS NOTE ADULT - SUBJECTIVE AND OBJECTIVE BOX
Anesthesia Pain Management Service    SUBJECTIVE: Patient reports pain is well-controlled and chest tube was taken out by team this morning.    Pain Scale Score	At rest: _3/10__ 	With Activity: ___ 	[X ] Refer to charted pain scores    THERAPY:    [ ] IV PCA Morphine		[ ] 5 mg/mL	[ ] 1 mg/mL  [X ] IV PCA Hydromorphone	[ ] 5 mg/mL	[X ] 1 mg/mL  [ ] IV PCA Fentanyl		[ ] 50 micrograms/mL    Demand dose __0.2_ lockout __6_ (minutes) Continuous Rate _0__ Total: _3.6__   mg used (in past 24 hrs)      MEDICATIONS  (STANDING):  acetaminophen     Tablet .. 650 milliGRAM(s) Oral every 6 hours  atorvastatin 20 milliGRAM(s) Oral at bedtime  heparin   Injectable 5000 Unit(s) SubCutaneous every 8 hours  pantoprazole    Tablet 40 milliGRAM(s) Oral before breakfast    MEDICATIONS  (PRN):  HYDROmorphone  Injectable 0.3 milliGRAM(s) IV Push every 3 hours PRN Severe Breakthrough Pain (7 - 10)  naloxone Injectable 0.1 milliGRAM(s) IV Push every 3 minutes PRN For ANY of the following changes in patient status:  A. RR LESS THAN 10 breaths per minute, B. Oxygen saturation LESS THAN 90%, C. Sedation score of 6  ondansetron Injectable 4 milliGRAM(s) IV Push every 6 hours PRN Nausea  oxyCODONE    IR 5 milliGRAM(s) Oral every 3 hours PRN Moderate-Severe Pain (4 - 10)      OBJECTIVE: Patient laying in bed, family members at bedside.    Sedation Score:	[ X] Alert	[ ] Drowsy 	[ ] Arousable	[ ] Asleep	[ ] Unresponsive    Side Effects:	[X ] None	[ ] Nausea	[ ] Vomiting	[ ] Pruritus  		[ ] Other:    Vital Signs Last 24 Hrs  T(C): 36.7 (14 Apr 2022 08:00), Max: 37.2 (13 Apr 2022 20:00)  T(F): 98.1 (14 Apr 2022 08:00), Max: 99 (14 Apr 2022 00:00)  HR: 75 (14 Apr 2022 09:00) (58 - 80)  BP: 130/76 (14 Apr 2022 09:00) (97/51 - 130/76)  BP(mean): 89 (14 Apr 2022 09:00) (60 - 89)  RR: 24 (14 Apr 2022 09:00) (12 - 24)  SpO2: 92% (14 Apr 2022 09:00) (92% - 100%)    ASSESSMENT/ PLAN    Therapy to  be:	[ ] Continue   [ X] Discontinued   [X ] Change to prn Analgesics    Documentation and Verification of current medications:   [X] Done	[ ] Not done, not elligible    Comments: Discussed patient with CTICU team, IV PCA discontinued. PRN Oral/IV opioids and/or Adjuvant non-opioid medication to be ordered at this point.    Progress Note written now but Patient was seen earlier.

## 2022-04-14 NOTE — DISCHARGE NOTE PROVIDER - NSDCFUADDINST_GEN_ALL_CORE_FT
Please keep your dressings dry until Saturday, then remove all dressings and shower daily. please call Dr pozo's office if you develop fever, shortness of breath, chest pain or wound redness/fould drainage.

## 2022-04-14 NOTE — DISCHARGE NOTE PROVIDER - NSDCFUADDAPPT_GEN_ALL_CORE_FT
Please call Dr pozo's office to schedule a follow up appointment in 1-2 weeks. Please have a chest xray done 1-2 days prior to your appointment and bring a copy with you on the day of your appointment

## 2022-04-14 NOTE — PROGRESS NOTE ADULT - SUBJECTIVE AND OBJECTIVE BOX
Anesthesia Pain Management Service- Attending Addendum    SUBJECTIVE: Patient's pain control adequate    Therapy:	  [ X] IV PCA	   [ ] Epidural           [ ] s/p Spinal Opoid              [ ] Postpartum infusion	  [ ] Patient controlled regional anesthesia (PCRA)    [ ] prn Analgesics    Allergies    No Known Allergies    Intolerances      MEDICATIONS  (STANDING):  acetaminophen     Tablet .. 650 milliGRAM(s) Oral every 6 hours  atorvastatin 20 milliGRAM(s) Oral at bedtime  heparin   Injectable 5000 Unit(s) SubCutaneous every 8 hours  pantoprazole    Tablet 40 milliGRAM(s) Oral before breakfast    MEDICATIONS  (PRN):  HYDROmorphone  Injectable 0.3 milliGRAM(s) IV Push every 3 hours PRN Severe Breakthrough Pain (7 - 10)  naloxone Injectable 0.1 milliGRAM(s) IV Push every 3 minutes PRN For ANY of the following changes in patient status:  A. RR LESS THAN 10 breaths per minute, B. Oxygen saturation LESS THAN 90%, C. Sedation score of 6  ondansetron Injectable 4 milliGRAM(s) IV Push every 6 hours PRN Nausea  oxyCODONE    IR 5 milliGRAM(s) Oral every 3 hours PRN Moderate-Severe Pain (4 - 10)      OBJECTIVE:   [X] No new signs     [ ] Other:    Side Effects:  [X ] None			[ ] Other:      ASSESSMENT/PLAN  -Discontinue current therapy    [ ] Therapy changed to:    [ ] IV PCA       [ ] Epidural     [ X] prn Analgesics     Comments: Pain management per primary team, APS to sign off

## 2022-04-14 NOTE — DISCHARGE NOTE PROVIDER - NSDCDCMDCOMP_GEN_ALL_CORE
Called pt.'s mother back regarding her question about the oxybutynin refills and reassured her that there are 11 refills of his medication. Mom verbalized an understanding.    This document is complete and the patient is ready for discharge.

## 2022-04-14 NOTE — DISCHARGE NOTE PROVIDER - CARE PROVIDER_API CALL
Marc Mancilla (MD)  Cardiovascular Disease; Internal Medicine; Interventional Cardiology  [address] [place] [address]  Phone: [phone]  Fax: [phone]  Follow Up Time:

## 2022-04-14 NOTE — DISCHARGE NOTE NURSING/CASE MANAGEMENT/SOCIAL WORK - NSDCPEFALRISK_GEN_ALL_CORE
For information on Fall & Injury Prevention, visit: [email or web address] OR  [email or web address] OR  https://www.cdc.gov/steadi/patient.html

## 2022-04-14 NOTE — DISCHARGE NOTE NURSING/CASE MANAGEMENT/SOCIAL WORK - PATIENT PORTAL LINK FT
You can access the FollowMyHealth Patient Portal offered by [facility] by registering at the following website: [email or web address]. By joining [facility]’s FollowMyHealth portal, you will also be able to view your health information using other applications (apps) compatible with our system.

## 2022-04-14 NOTE — DISCHARGE NOTE PROVIDER - NSDCMRMEDTOKEN_GEN_ALL_CORE_FT
atorvastatin 20 mg oral tablet: 1 tab(s) orally once a day PM  ClearLax oral powder for reconstitution: 17 gram(s) orally once a day as needed for constipation  Fioricet oral tablet: 1 tab(s) orally 2 times a day, As Needed  omeprazole 40 mg oral delayed release capsule: 1 cap(s) orally once a day PM  oxyCODONE 5 mg oral tablet: 1 tab(s) orally every 4 hours MDD:6 as needed for moderate pain  Senna Lax 15 mg oral tablet: 2 tab(s) orally once a day (at bedtime) as needed for constipation  Tylenol 325 mg oral tablet: 2 tab(s) orally every 4 hours as needed for mild pain

## 2022-04-14 NOTE — DISCHARGE NOTE PROVIDER - NSDCCPTREATMENT_GEN_ALL_CORE_FT
PRINCIPAL PROCEDURE  Procedure: Robotic assistance in thoracoscopic procedure  Findings and Treatment: thymectomy

## 2022-04-14 NOTE — DISCHARGE NOTE PROVIDER - HOSPITAL COURSE
HPI:  56 year old male with PMH of HLD, migraine, former smoker  presents to Presurgical testing with diagnosis of  other disease of mediastinum , not elsewhere classified  scheduled for robotic right video assisted thoracoscopy, excision of anterior mediastinum mass.   (07 Apr 2022 09:32)    On 04/13/2022 pt underwent Robotic RVATS, Thymectomy. He tolerated procedure well and was discharged the next day after chest tube removal.   HPI:  56 year old male with PMH of HLD, migraine, former smoker  presents to Presurgical testing with diagnosis of  other disease of mediastinum , not elsewhere classified  scheduled for robotic right video assisted thoracoscopy, excision of anterior mediastinum mass.   (07 Apr 2022 09:32)    On 04/13/2022 pt underwent Robotic RVATS, Thymectomy. He tolerated procedure well and was discharged the next day after chest tube removal..

## 2022-04-18 PROBLEM — G43.909 MIGRAINE, UNSPECIFIED, NOT INTRACTABLE, WITHOUT STATUS MIGRAINOSUS: Chronic | Status: ACTIVE | Noted: 2022-04-07

## 2022-04-18 PROBLEM — Z87.891 PERSONAL HISTORY OF NICOTINE DEPENDENCE: Chronic | Status: ACTIVE | Noted: 2022-04-07

## 2022-04-18 PROBLEM — K21.9 GASTRO-ESOPHAGEAL REFLUX DISEASE WITHOUT ESOPHAGITIS: Chronic | Status: ACTIVE | Noted: 2022-04-07

## 2022-04-18 PROBLEM — E78.5 HYPERLIPIDEMIA, UNSPECIFIED: Chronic | Status: ACTIVE | Noted: 2022-04-07

## 2022-04-19 ENCOUNTER — APPOINTMENT (OUTPATIENT)
Dept: THORACIC SURGERY | Facility: HOSPITAL | Age: 57
End: 2022-04-19

## 2022-04-19 LAB — SURGICAL PATHOLOGY STUDY: SIGNIFICANT CHANGE UP

## 2022-04-21 ENCOUNTER — APPOINTMENT (OUTPATIENT)
Dept: THORACIC SURGERY | Facility: CLINIC | Age: 57
End: 2022-04-21
Payer: MEDICAID

## 2022-04-21 VITALS
SYSTOLIC BLOOD PRESSURE: 103 MMHG | DIASTOLIC BLOOD PRESSURE: 68 MMHG | BODY MASS INDEX: 24.25 KG/M2 | TEMPERATURE: 98.3 F | HEART RATE: 68 BPM | OXYGEN SATURATION: 97 % | WEIGHT: 160 LBS | RESPIRATION RATE: 18 BRPM

## 2022-04-21 DIAGNOSIS — D49.89 NEOPLASM OF UNSPECIFIED BEHAVIOR OF OTHER SPECIFIED SITES: ICD-10-CM

## 2022-04-21 PROCEDURE — 99024 POSTOP FOLLOW-UP VISIT: CPT

## 2022-04-22 PROBLEM — D49.89 ANTERIOR MEDIASTINAL TUMOR: Status: ACTIVE | Noted: 2022-03-21

## 2022-04-22 RX ORDER — CARBOXYMETHYLCELLULOSE SODIUM 5 MG/ML
0.5 SOLUTION/ DROPS OPHTHALMIC
Qty: 15 | Refills: 0 | Status: ACTIVE | COMMUNITY
Start: 2022-04-12

## 2022-04-22 RX ORDER — ACETAMINOPHEN 325 MG/1
325 TABLET ORAL
Qty: 84 | Refills: 0 | Status: ACTIVE | COMMUNITY
Start: 2022-04-14

## 2022-04-22 RX ORDER — CODEINE/BUTALBITAL/ASA/CAFFEIN 30-50-325
50-200-40-30 CAPSULE ORAL
Refills: 0 | Status: COMPLETED | COMMUNITY
End: 2022-04-22

## 2022-04-22 RX ORDER — BUTALBITAL, ACETAMINOPHEN AND CAFFEINE 325; 50; 40 MG/1; MG/1; MG/1
50-325-40 TABLET ORAL
Qty: 60 | Refills: 0 | Status: ACTIVE | COMMUNITY
Start: 2022-04-12

## 2022-04-22 RX ORDER — SENNOSIDES 8.6 MG TABLETS 8.6 MG/1
8.6 TABLET ORAL
Qty: 14 | Refills: 0 | Status: ACTIVE | COMMUNITY
Start: 2022-04-14

## 2022-04-22 RX ORDER — POLYETHYLENE GLYCOL 3350 17 G/17G
17 POWDER, FOR SOLUTION ORAL
Qty: 238 | Refills: 0 | Status: ACTIVE | COMMUNITY
Start: 2022-04-14

## 2022-04-22 RX ORDER — MINERAL OIL, PETROLATUM 425; 573 MG/G; MG/G
OINTMENT OPHTHALMIC
Qty: 4 | Refills: 0 | Status: ACTIVE | COMMUNITY
Start: 2022-04-12

## 2022-04-22 RX ORDER — OXYCODONE 5 MG/1
5 TABLET ORAL
Qty: 30 | Refills: 0 | Status: ACTIVE | COMMUNITY
Start: 2022-04-14

## 2022-06-08 NOTE — PROGRESS NOTE ADULT - SUBJECTIVE AND OBJECTIVE BOX
Wound care   POST ANESTHESIA EVALUATION    56y Male POSTOP DAY 1 S/P     MENTAL STATUS: Patient participation [ x ] Awake     [  ] Arousable     [  ] Sedated    AIRWAY PATENCY: [ x ] Satisfactory  [  ] Other:     Vital Signs Last 24 Hrs  T(C): 36.7 (14 Apr 2022 08:00), Max: 37.2 (13 Apr 2022 20:00)  T(F): 98.1 (14 Apr 2022 08:00), Max: 99 (14 Apr 2022 00:00)  HR: 64 (14 Apr 2022 11:00) (63 - 80)  BP: 107/60 (14 Apr 2022 11:00) (97/51 - 130/76)  BP(mean): 69 (14 Apr 2022 11:00) (60 - 89)  RR: 19 (14 Apr 2022 11:00) (14 - 24)  SpO2: 96% (14 Apr 2022 11:00) (92% - 99%)  I&O's Summary    13 Apr 2022 07:01  -  14 Apr 2022 07:00  --------------------------------------------------------  IN: 810 mL / OUT: 850 mL / NET: -40 mL    14 Apr 2022 07:01  -  14 Apr 2022 14:49  --------------------------------------------------------  IN: 30 mL / OUT: 400 mL / NET: -370 mL          NAUSEA/ VOMITTING:  [ x ] NONE  [  ] CONTROLLED [  ] OTHER     PAIN: [x  ] CONTROLLED WITH CURRENT REGIMEN  [  ] OTHER    [ x ] NO APPARENT ANESTHESIA COMPLICATIONS      Comments:

## 2024-11-04 NOTE — H&P PST ADULT - DENTITION
Stable and doing well on current dose of Zoloft. Will continue.   Denies dentures. Denies loose teeth.

## (undated) DEVICE — CHEST DRAIN OASIS DRY SUCTION WATER SEAL

## (undated) DEVICE — TUBING STRYKEFLOW II SUCTION / IRRIGATOR

## (undated) DEVICE — SUT PROLENE 0 30" CT-1

## (undated) DEVICE — XI ARM CLIP APPLIER MEDIUM-LARGE

## (undated) DEVICE — BLADE SURGICAL #10 STAINLESS

## (undated) DEVICE — TROCAR COVIDIEN VERSAONE BLADELESS FIXATION 12MM STANDARD

## (undated) DEVICE — VENODYNE/SCD SLEEVE CALF MEDIUM

## (undated) DEVICE — GRASPER LAPA 5MMX35CM

## (undated) DEVICE — DRSG CURITY GAUZE SPONGE 4 X 4" 12-PLY

## (undated) DEVICE — STERIS DEFENDO 3-PIECE KIT (AIR/WATER, SUCTION & BIOPSY VALVES)

## (undated) DEVICE — TUBING OLYMPUS INSUFFLATION

## (undated) DEVICE — XI DRAPE COLUMN

## (undated) DEVICE — DURABLE MEDICAL EQUIPMENT: Type: DURABLE MEDICAL EQUIPMENT

## (undated) DEVICE — ENDOCATCH GENERAL 15MM (PURPLE)

## (undated) DEVICE — SUT VICRYL 0 27" UR-6

## (undated) DEVICE — ENDOCATCH GENERAL 10MM (PURPLE)

## (undated) DEVICE — TUBING SUCTION 20FT

## (undated) DEVICE — XI OBTURATOR BLADELESS LONG OPTICAL 8MM

## (undated) DEVICE — ELCTR BOVIE TIP BLADE INSULATED 2.75" EDGE

## (undated) DEVICE — SUT VICRYL 4-0 27" PS-2 UNDYED

## (undated) DEVICE — SYR LUER LOK 20CC

## (undated) DEVICE — XI ARM FORCEP FENESTRATED BIPOLAR 8MM

## (undated) DEVICE — D HELP - CLEARVIEW CLEARIFY SYSTEM

## (undated) DEVICE — XI DRAPE ARM

## (undated) DEVICE — GOWN XL

## (undated) DEVICE — XI OBTURATOR OPTICAL BLADELESS 8MM

## (undated) DEVICE — XI SEAL UNIV 5- 8 MM

## (undated) DEVICE — DRSG TEGADERM 4X4.75"

## (undated) DEVICE — DRSG GAUZE PACKTNER ROLL

## (undated) DEVICE — ELCTR BOVIE TIP BLADE INSULATED 6.5" EDGE

## (undated) DEVICE — TROCAR COVIDIEN VERSASTEP PLUS 15MM STANDARD

## (undated) DEVICE — SOL IRR POUR NS 0.9% 500ML

## (undated) DEVICE — Device

## (undated) DEVICE — DRSG MASTISOL

## (undated) DEVICE — NDL HYPO REGULAR BEVEL 22G X 1.5" (TURQUOISE)

## (undated) DEVICE — MARKING PEN W RULER

## (undated) DEVICE — SUT SILK 2-0 30" SH

## (undated) DEVICE — PACK ROBOTIC LIJ